# Patient Record
Sex: FEMALE | Race: WHITE
[De-identification: names, ages, dates, MRNs, and addresses within clinical notes are randomized per-mention and may not be internally consistent; named-entity substitution may affect disease eponyms.]

---

## 2018-06-02 ENCOUNTER — HOSPITAL ENCOUNTER (EMERGENCY)
Dept: HOSPITAL 25 - ED | Age: 23
LOS: 1 days | Discharge: HOME | End: 2018-06-03
Payer: COMMERCIAL

## 2018-06-02 ENCOUNTER — HOSPITAL ENCOUNTER (EMERGENCY)
Dept: HOSPITAL 25 - ED | Age: 23
Discharge: HOME | End: 2018-06-02
Payer: COMMERCIAL

## 2018-06-02 VITALS — SYSTOLIC BLOOD PRESSURE: 121 MMHG | DIASTOLIC BLOOD PRESSURE: 77 MMHG

## 2018-06-02 DIAGNOSIS — R11.0: ICD-10-CM

## 2018-06-02 DIAGNOSIS — R10.13: Primary | ICD-10-CM

## 2018-06-02 DIAGNOSIS — N39.0: ICD-10-CM

## 2018-06-02 DIAGNOSIS — R10.13: ICD-10-CM

## 2018-06-02 DIAGNOSIS — N39.0: Primary | ICD-10-CM

## 2018-06-02 DIAGNOSIS — R10.9: ICD-10-CM

## 2018-06-02 DIAGNOSIS — R21: ICD-10-CM

## 2018-06-02 PROCEDURE — 86140 C-REACTIVE PROTEIN: CPT

## 2018-06-02 PROCEDURE — 36415 COLL VENOUS BLD VENIPUNCTURE: CPT

## 2018-06-02 PROCEDURE — 87086 URINE CULTURE/COLONY COUNT: CPT

## 2018-06-02 PROCEDURE — 99282 EMERGENCY DEPT VISIT SF MDM: CPT

## 2018-06-02 PROCEDURE — 85025 COMPLETE CBC W/AUTO DIFF WBC: CPT

## 2018-06-02 PROCEDURE — 81015 MICROSCOPIC EXAM OF URINE: CPT

## 2018-06-02 PROCEDURE — 84702 CHORIONIC GONADOTROPIN TEST: CPT

## 2018-06-02 PROCEDURE — 99283 EMERGENCY DEPT VISIT LOW MDM: CPT

## 2018-06-02 PROCEDURE — 83690 ASSAY OF LIPASE: CPT

## 2018-06-02 PROCEDURE — 81003 URINALYSIS AUTO W/O SCOPE: CPT

## 2018-06-02 PROCEDURE — 83605 ASSAY OF LACTIC ACID: CPT

## 2018-06-02 PROCEDURE — 80053 COMPREHEN METABOLIC PANEL: CPT

## 2018-06-02 PROCEDURE — 87077 CULTURE AEROBIC IDENTIFY: CPT

## 2018-06-02 NOTE — ED
GI/ HPI





- HPI Summary


HPI Summary: 





Complains of increased urinary urge 5 hours, epigastric pain 1 hour with mild 

nausea.  History of same symptoms with prior UTI 1 year ago.  Denies fever, v/d

, vaginal sx. 





- History of Current Complaint


Chief Complaint: EDUrogenitalProblems


Time Seen by Provider: 06/02/18 21:54


Stated Complaint: POSSIBLE UTI


Hx Obtained From: Patient


Hx Last Menstrual Period: 1 MONTH AGO


Pain Intensity: 6





- Allergy/Home Medications


Allergies/Adverse Reactions: 


 Allergies











Allergy/AdvReac Type Severity Reaction Status Date / Time


 


No Known Allergies Allergy   Verified 06/02/18 23:55











Home Medications: 


 Home Medications





Etonogestrel [Nexplanon] 68 mg IMPLANT SEE INSTRUCTIONS 06/02/18 [History 

Confirmed 06/02/18]











PMH/Surg Hx/FS Hx/Imm Hx


Respiratory History: Reports: Other Respiratory Problems/Disorders - H/O 

PNEUMONIA FALL 2014


Infectious Disease History: No


Infectious Disease History: 


   Denies: Traveled Outside the [place] in Last 30 Days





- Social History


Alcohol Use: Occasionally


Alcohol Amount: Vodka occ


Substance Use Type: Reports: None


Smoking Status (MU): Never Smoked Tobacco





Review of Systems





- ROS Summary


Review of Systems Summary: 





No CVA tenderness bilaterally.  Mild tenderness to palpation epigastrically.


Constitutional: Negative


Eyes: Negative


ENT: Negative


Cardiovascular: Negative


Respiratory: Negative


Positive: Abdominal Pain, Nausea


Positive: urgency


Musculoskeletal: Negative


Positive: Rash


Neurological: Negative


Psychological: Normal


All Other Systems Reviewed And Are Negative: Yes





Physical Exam





- Summary


Physical Exam Summary: 





Complains of increased urinary urge 5 hours, epigastric pain 1 hour with mild 

nausea.  History of same symptoms with prior UTI 1 year ago.  Denies fever, v/d

, vaginal sx. 


Triage Information Reviewed: Yes


Vital Signs On Initial Exam: 


 Initial Vitals











Temp Pulse Resp BP Pulse Ox


 


 97.7 F   61   16   128/81   100 


 


 06/02/18 21:05  06/02/18 21:05  06/02/18 21:05  06/02/18 21:05  06/02/18 21:05











Vital Signs Reviewed: Yes


Appearance: Positive: Well-Appearing


Skin: Positive: Warm


Head/Face: Positive: Normal Head/Face Inspection


Eyes: Positive: Normal


Neck: Positive: Supple


Respiratory/Lung Sounds: Positive: Clear to Auscultation


Cardiovascular: Positive: Normal


Musculoskeletal: Positive: Normal


Neurological: Positive: Normal


Psychiatric: Positive: Normal


AVPU Assessment: Alert





- [place] Coma Scale


Best Eye Response: 4 - Spontaneous


Best Motor Response: 6 - Obeys Commands


Best Verbal Response: 5 - Oriented


Coma Scale Total: 15





Diagnostics





- Vital Signs


 Vital Signs











  Temp Pulse Resp BP Pulse Ox


 


 06/02/18 21:05  97.7 F  61  16  128/81  100














- Laboratory


Lab Results: 


 Lab Results











  06/02/18 Range/Units





  21:17 


 


Urine Color  Yellow  


 


Urine Appearance  Cloudy  


 


Urine pH  7.0  (5-9)  


 


Ur Specific Gravity  1.019  (1.010-1.030)  


 


Urine Protein  1+(30 mg/dl) A  (Negative)  


 


Urine Ketones  Negative  (Negative)  


 


Urine Blood  1+ A  (Negative)  


 


Urine Nitrate  Negative  (Negative)  


 


Urine Bilirubin  Negative  (Negative)  


 


Urine Urobilinogen  Negative  (Negative)  


 


Ur Leukocyte Esterase  3+ A  (Negative)  


 


Urine WBC (Auto)  3+(>20/hpf) A  (Absent)  


 


Urine RBC (Auto)  3+(>10/hpf) A  (Absent)  


 


Ur Squamous Epith Cells  Present A  (Absent)  


 


Urine Bacteria  Absent  (Absent)  


 


Urine Glucose  Negative  (Negative)  











Lab Statement: Any lab studies that have been ordered have been reviewed, and 

results considered in the medical decision making process.





GIGU Course/Dx





- Course


Course Of Treatment: Complains of increased urinary urge 5 hours, epigastric 

pain 1 hour with mild nausea.  History of same symptoms with prior UTI 1 year 

ago.  Denies fever, v/d, vaginal sx.  Complains of increased urinary urge 5 

hours, epigastric pain 1 hour with mild nausea.  History of same symptoms with 

prior UTI 1 year ago.  Denies fever, v/d, vaginal sx.  Positive for UTI.  Rx 

for Cipro





- Diagnoses


Provider Diagnoses: 


 UTI (urinary tract infection)








Discharge





- Sign-Out/Discharge


Documenting (check all that apply): Discharge/Admit/Transfer





- Discharge Plan


Condition: Stable


Disposition: HOME


Prescriptions: 


Ciprofloxacin HCl [Cipro] 500 mg PO BID 10 Days #20 tablet


Patient Education Materials:  Urinary Tract Infection in Women (ED)


Referrals: 


No Primary Care Phys,NOPCP [Primary Care Provider] - 


[facility] [Outside]


Additional Instructions: 


Take antibiotics as directed.  Follow-up with primary care.  Return to the ED 

for any new or worsening symptoms





- Billing Disposition and Condition


Condition: STABLE


Disposition: HOME

## 2018-06-03 VITALS — SYSTOLIC BLOOD PRESSURE: 107 MMHG | DIASTOLIC BLOOD PRESSURE: 72 MMHG

## 2018-06-03 LAB
BASOPHILS # BLD AUTO: 0.1 10^3/UL (ref 0–0.2)
EOSINOPHIL # BLD AUTO: 0.1 10^3/UL (ref 0–0.6)
HCT VFR BLD AUTO: 38 % (ref 35–47)
HGB BLD-MCNC: 12.8 G/DL (ref 12–16)
LYMPHOCYTES # BLD AUTO: 2.5 10^3/UL (ref 1–4.8)
MCH RBC QN AUTO: 31 PG (ref 27–31)
MCHC RBC AUTO-ENTMCNC: 33 G/DL (ref 31–36)
MCV RBC AUTO: 94 FL (ref 80–97)
MONOCYTES # BLD AUTO: 0.6 10^3/UL (ref 0–0.8)
NEUTROPHILS # BLD AUTO: 6.7 10^3/UL (ref 1.5–7.7)
NRBC # BLD AUTO: 0 10^3/UL
NRBC BLD QL AUTO: 0
PLATELET # BLD AUTO: 230 10^3/UL (ref 150–450)
RBC # BLD AUTO: 4.07 10^6/UL (ref 4–5.4)
WBC # BLD AUTO: 10 10^3/UL (ref 3.5–10.8)

## 2018-06-03 NOTE — ED
Abdominal Pain/Female





- HPI Summary


HPI Summary: 





Patient returns for evaluation of epigastric pain and nausea.  Seen here 

earlier today by this provider for urinary symptoms.  Positive for UTI.  

Patient was accidentally discharged prior to complete evaluation of epigastric 

pain.  Conversation was had with the patient stating she could go home and try 

to see if the UTI antibiotics improved all her symptoms, or reregister for 

further evaluation of epigastric pain.  Patient opted to reregister and be 

evaluated.  Denies change in symptoms.





- History of Current Complaint


Chief Complaint: EDAbdPain


Stated Complaint: ABD PAIN


Time Seen by Provider: 06/02/18 23:39


Hx Obtained From: Patient


Hx Last Menstrual Period: 1 MONTH AGO


Pregnant?: No


Pain Intensity: 7


Allergies/Adverse Reactions: 


 Allergies











Allergy/AdvReac Type Severity Reaction Status Date / Time


 


No Known Allergies Allergy   Verified 06/02/18 23:55














PMH/Surg Hx/FS Hx/Imm Hx


Respiratory History: Reports: Other Respiratory Problems/Disorders - H/O 

PNEUMONIA FALL 2014


Infectious Disease History: No


Infectious Disease History: 


   Denies: Traveled Outside the [place] in Last 30 Days





- Social History


Alcohol Use: Occasionally


Alcohol Amount: Vodka occ


Substance Use Type: Reports: None


Smoking Status (MU): Never Smoked Tobacco





Review of Systems


Constitutional: Negative


Eyes: Negative


ENT: Negative


Cardiovascular: Negative


Respiratory: Negative


Positive: Abdominal Pain, Nausea


Positive: dysuria


Musculoskeletal: Negative


Skin: Negative


Neurological: Negative


Psychological: Normal


All Other Systems Reviewed And Are Negative: Yes





Physical Exam





- Summary


Physical Exam Summary: 





Mildly tender to palpation epigastrically.  Negative Mullen's.  No tenderness 

to palpation in bilateral lower quadrants suprapubically, left upper quadrant. 


Triage Information Reviewed: Yes


Vital Signs On Initial Exam: 


 Initial Vitals











Temp Pulse Resp BP Pulse Ox


 


 98.1 F   56   16   107/78   100 


 


 06/02/18 23:51  06/02/18 23:51  06/02/18 23:51  06/02/18 23:51  06/02/18 23:51











Vital Signs Reviewed: Yes


Appearance: Positive: Well-Appearing


Skin: Positive: Warm


Head/Face: Positive: Normal Head/Face Inspection


Eyes: Positive: Normal


Neck: Positive: Supple


Respiratory/Lung Sounds: Positive: Clear to Auscultation


Cardiovascular: Positive: Normal


Musculoskeletal: Positive: Normal


Neurological: Positive: Normal


Psychiatric: Positive: Normal


AVPU Assessment: Alert





- [place] Coma Scale


Best Eye Response: 4 - Spontaneous


Best Motor Response: 6 - Obeys Commands


Best Verbal Response: 5 - Oriented


Coma Scale Total: 15





Diagnostics





- Vital Signs


 Vital Signs











  Temp Pulse Resp BP Pulse Ox


 


 06/02/18 23:51  98.1 F  56  16  107/78  100














- Laboratory


Lab Results: 


 Lab Results











  06/02/18 06/02/18 06/02/18 Range/Units





  23:56 23:57 23:57 


 


WBC  10.0    (3.5-10.8)  10^3/ul


 


RBC  4.07    (4.0-5.4)  10^6/ul


 


Hgb  12.8    (12.0-16.0)  g/dl


 


Hct  38    (35-47)  %


 


MCV  94    (80-97)  fL


 


MCH  31    (27-31)  pg


 


MCHC  33    (31-36)  g/dl


 


RDW  13    (10.5-15)  %


 


Plt Count  230    (150-450)  10^3/ul


 


MPV  8.8    (7.4-10.4)  um3


 


Neut % (Auto)  66.7    (38-83)  %


 


Lymph % (Auto)  24.8 L    (25-47)  %


 


Mono % (Auto)  6.1    (0-7)  %


 


Eos % (Auto)  1.3    (0-6)  %


 


Baso % (Auto)  1.1    (0-2)  %


 


Absolute Neuts (auto)  6.7    (1.5-7.7)  10^3/ul


 


Absolute Lymphs (auto)  2.5    (1.0-4.8)  10^3/ul


 


Absolute Monos (auto)  0.6    (0-0.8)  10^3/ul


 


Absolute Eos (auto)  0.1    (0-0.6)  10^3/ul


 


Absolute Basos (auto)  0.1    (0-0.2)  10^3/ul


 


Absolute Nucleated RBC  0    10^3/ul


 


Nucleated RBC %  0    


 


Sodium   139   (139-145)  mmol/L


 


Potassium   4.0   (3.5-5.0)  mmol/L


 


Chloride   105   (101-111)  mmol/L


 


Carbon Dioxide   28   (22-32)  mmol/L


 


Anion Gap   6   (2-11)  mmol/L


 


BUN   11   (6-24)  mg/dL


 


Creatinine   0.70   (0.51-0.95)  mg/dL


 


Est GFR ( Amer)   134.6   (>60)  


 


Est GFR (Non-Af Amer)   104.6   (>60)  


 


BUN/Creatinine Ratio   15.7   (8-20)  


 


Glucose   85   ()  mg/dL


 


Lactic Acid    0.9  (0.5-2.0)  mmol/L


 


Calcium   9.4   (8.6-10.3)  mg/dL


 


Total Bilirubin   0.30   (0.2-1.0)  mg/dL


 


AST   15   (13-39)  U/L


 


ALT   13   (7-52)  U/L


 


Alkaline Phosphatase   67   ()  U/L


 


C-Reactive Protein   3.89   (< 5.00)  mg/L


 


Total Protein   7.0   (6.4-8.9)  g/dL


 


Albumin   4.2   (3.2-5.2)  g/dL


 


Globulin   2.8   (2-4)  g/dL


 


Albumin/Globulin Ratio   1.5   (1-3)  


 


Lipase   18   (11.0-82.0)  U/L


 


Beta HCG, Quant   < 0.60   mIU/mL











Result Diagrams: 


 06/02/18 23:56





 06/02/18 23:57


Lab Statement: Any lab studies that have been ordered have been reviewed, and 

results considered in the medical decision making process.





Abdominal Pain Fem Course/Dx





- Course


Course Of Treatment: Patient returns for evaluation of epigastric pain and 

nausea.  Seen here earlier today by this provider for urinary symptoms.  

Positive for UTI.  Patient was accidentally discharged prior to complete 

evaluation of epigastric pain.  Conversation was had with the patient stating 

she could go home and try to see if the UTI antibiotics improved all her 

symptoms, or reregister for further evaluation of epigastric pain.  Patient 

opted to reregister and be evaluated.  Denies change in symptoms.  Mildly 

tender to palpation epigastrically.  Negative Mullen's.  No tenderness to 

palpation in bilateral lower quadrants suprapubically, left upper quadrant.  

Labs unremarkable, including liver enzymes, alkaline phosphatase, lipase, white 

count, CRP.  Afebrile, vital signs within normal limits.  No history of 

pancreatic disease or gallbladder symptoms.  Positive history of intermittent 

stomach discomfort from eating.  Nausea improved with Zofran.  Abdominal pain 

improved some with Tylenol and ibuprofen.  Patient agrees to see if pain 

improves with antibiotics for UTI.  Will return if symptoms worsen.





- Diagnoses


Provider Diagnoses: 


 Epigastric pain








Discharge





- Sign-Out/Discharge


Documenting (check all that apply): Discharge/Admit/Transfer





- Discharge Plan


Condition: Stable


Disposition: HOME


Patient Education Materials:  Epigastric Pain (ED)


Referrals: 


No Primary Care Phys,NOPCP [Primary Care Provider] - 


[facility] [Outside]


Additional Instructions: 


If Symptoms do not improve with antibiotics for UTI follow-up with primary 

care.  Return to the ED for any new or worsening symptoms





- Billing Disposition and Condition


Condition: STABLE


Disposition: HOME

## 2018-06-05 NOTE — ED
Progress





- Progress Note


Progress Note: 


Patient's final urine culture reveals greater than 100,000 Staphylococcus 

saprophyticus.  Patient was discharged on a fluoroquinolone to which organism 

is inherently sensitive.  No change in treatment at this time.








Course/Dx





- Course


Course Of Treatment: Complains of increased urinary urge 5 hours, epigastric 

pain 1 hour with mild nausea.  History of same symptoms with prior UTI 1 year 

ago.  Denies fever, v/d, vaginal sx.  Complains of increased urinary urge 5 

hours, epigastric pain 1 hour with mild nausea.  History of same symptoms with 

prior UTI 1 year ago.  Denies fever, v/d, vaginal sx.  Positive for UTI.  Rx 

for Cipro





- Diagnoses


Provider Diagnoses: 


 UTI (urinary tract infection)








Discharge





- Sign-Out/Discharge


Documenting (check all that apply): Post-Discharge Follow Up





- Discharge Plan


Condition: Stable


Disposition: HOME


Prescriptions: 


Ciprofloxacin HCl [Cipro] 500 mg PO BID 10 Days #20 tablet


Patient Education Materials:  Urinary Tract Infection in Women (ED)


Referrals: 


[facility] [Outside]


No Primary Care Phys,NOPCP [Primary Care Provider] - 


Additional Instructions: 


Take antibiotics as directed.  Follow-up with primary care.  Return to the ED 

for any new or worsening symptoms





- Billing Disposition and Condition


Condition: STABLE


Disposition: Home